# Patient Record
Sex: FEMALE | Race: OTHER
[De-identification: names, ages, dates, MRNs, and addresses within clinical notes are randomized per-mention and may not be internally consistent; named-entity substitution may affect disease eponyms.]

---

## 2018-03-06 ENCOUNTER — HOSPITAL ENCOUNTER (OUTPATIENT)
Dept: HOSPITAL 72 - PAN | Age: 48
Discharge: HOME | End: 2018-03-06
Payer: COMMERCIAL

## 2018-03-06 VITALS — DIASTOLIC BLOOD PRESSURE: 86 MMHG | SYSTOLIC BLOOD PRESSURE: 134 MMHG

## 2018-03-06 VITALS — WEIGHT: 120 LBS | BODY MASS INDEX: 18.83 KG/M2 | HEIGHT: 67 IN

## 2018-03-06 DIAGNOSIS — K21.9: ICD-10-CM

## 2018-03-06 DIAGNOSIS — K76.0: ICD-10-CM

## 2018-03-06 DIAGNOSIS — R11.2: ICD-10-CM

## 2018-03-06 DIAGNOSIS — K80.20: Primary | ICD-10-CM

## 2018-03-06 DIAGNOSIS — E78.5: ICD-10-CM

## 2018-03-06 DIAGNOSIS — R79.89: ICD-10-CM

## 2018-03-07 NOTE — GI INITIAL CONSULT NOTE
History of Present Illness


General


Date patient seen:  Mar 6, 2018


Time patient seen:  11:00


Referring physician:  TRA MCCLELLAN


Reason for Consultation:  ABDOMINAL PAIN





Present Illness


HPI


48 year old female patient referred by Dr. Mcclellan for evaluation of abdominal 

pain and elevated LFTs.  The patient presents today with c/o generalized 

abdominal pain, bloating, nausea with occasional vomiting and GERD.   Abdominal 

U/S reviewed shows fatty liver and cholelithiasis.  Labs reviewed shows vitamin 

D deficiency.  Denies any unintentional weight loss or changes in dietary 

habits. No signs of abuse or neglect.  Patient is not fall risk.


Home Meds


Reported Medications


Omeprazole (OMEPRAZOLE) 40 Mg Capsule., 40 MG ORAL DAILY, CAP


   3/6/18


Med list reviewed/reconciled:  Yes


Allergies:  


Coded Allergies:  


     No Known Allergies (Unverified , 3/6/18)





Patient History


History Provided By:  Patient, Medical Record


PMH Narrative


GERD


HLD





Past Surgical History:


 x 2


Family History Narrative


Father >> Lung CA


Social History Narrative


ETOH social use approximately 2 a month.


Tobacco use x 8 years, but has quit for 18 years


occasional caffeine use





Review of Systems


All Other Systems:  negative except mentioned in HPI





Physical Exam





Vital Signs








  Date Time  Temp Pulse Resp B/P (MAP) Pulse Ox O2 Delivery O2 Flow Rate FiO2


 


3/6/18 09:55 98.7 73  134/86 99   





 98.7       








Sp02 EP Interpretation:  reviewed, normal


General Appearance:  well appearing, no apparent distress, alert


Head:  normocephalic


EENT:  PERRL/EOMI, normal ENT inspection


Neck:  supple


Respiratory:  normal breath sounds, no respiratory distress


Cardiovascular:  normal rate


Gastrointestinal:  normal inspection, non tender, soft, normal bowel sounds, non

-distended


Rectal:  deferred


Genitourinary:  no CVA tenderness


Musculoskeletal:  normal inspection, back normal


Neurologic:  normal inspection, alert, oriented x3, responsive


Psychiatric:  normal inspection, judgement/insight normal, memory normal


Skin:  normal inspection, normal color, no rash, warm/dry, palpation normal, 

well hydrated


Lymphatic:  normal inspection, no adenopathy





GI: Plan


Problems:  


(1) Cholelithiasis


(2) GERD (gastroesophageal reflux disease)


(3) Nausea & vomiting


(4) LFT elevation


Plan


abdominal U/S reviewed


labs reviewed





patient requires cholecystectomy


will refer patient to general surgery





Seen with Dr. Vosoghi.


Thank you for this patient referral.











Matilda Dumont N.P. Mar 7, 2018 16:22

## 2018-05-02 ENCOUNTER — HOSPITAL ENCOUNTER (INPATIENT)
Dept: HOSPITAL 72 - EMR | Age: 48
LOS: 1 days | Discharge: HOME | DRG: 446 | End: 2018-05-03
Payer: COMMERCIAL

## 2018-05-02 VITALS — BODY MASS INDEX: 30.12 KG/M2 | WEIGHT: 170 LBS | HEIGHT: 63 IN

## 2018-05-02 VITALS — SYSTOLIC BLOOD PRESSURE: 141 MMHG | DIASTOLIC BLOOD PRESSURE: 77 MMHG

## 2018-05-02 VITALS — SYSTOLIC BLOOD PRESSURE: 140 MMHG | DIASTOLIC BLOOD PRESSURE: 76 MMHG

## 2018-05-02 VITALS — SYSTOLIC BLOOD PRESSURE: 135 MMHG | DIASTOLIC BLOOD PRESSURE: 74 MMHG

## 2018-05-02 VITALS — DIASTOLIC BLOOD PRESSURE: 85 MMHG | SYSTOLIC BLOOD PRESSURE: 137 MMHG

## 2018-05-02 VITALS — DIASTOLIC BLOOD PRESSURE: 75 MMHG | SYSTOLIC BLOOD PRESSURE: 134 MMHG

## 2018-05-02 DIAGNOSIS — K80.10: Primary | ICD-10-CM

## 2018-05-02 DIAGNOSIS — N83.202: ICD-10-CM

## 2018-05-02 DIAGNOSIS — K21.9: ICD-10-CM

## 2018-05-02 LAB
ADD MANUAL DIFF: NO
ALBUMIN SERPL-MCNC: 4 G/DL (ref 3.4–5)
ALBUMIN/GLOB SERPL: 1 {RATIO} (ref 1–2.7)
ALP SERPL-CCNC: 68 U/L (ref 46–116)
ALT SERPL-CCNC: 33 U/L (ref 12–78)
ANION GAP SERPL CALC-SCNC: 9 MMOL/L (ref 5–15)
APPEARANCE UR: CLEAR
APTT BLD: 29 SEC (ref 23–33)
APTT PPP: YELLOW S
AST SERPL-CCNC: 38 U/L (ref 15–37)
BASOPHILS NFR BLD AUTO: 0.9 % (ref 0–2)
BILIRUB SERPL-MCNC: 0.8 MG/DL (ref 0.2–1)
BUN SERPL-MCNC: 14 MG/DL (ref 7–18)
CALCIUM SERPL-MCNC: 9.2 MG/DL (ref 8.5–10.1)
CHLORIDE SERPL-SCNC: 101 MMOL/L (ref 98–107)
CO2 SERPL-SCNC: 28 MMOL/L (ref 21–32)
CREAT SERPL-MCNC: 0.8 MG/DL (ref 0.55–1.3)
EOSINOPHIL NFR BLD AUTO: 3.4 % (ref 0–3)
ERYTHROCYTE [DISTWIDTH] IN BLOOD BY AUTOMATED COUNT: 11.4 % (ref 11.6–14.8)
GLOBULIN SER-MCNC: 4.2 G/DL
GLUCOSE UR STRIP-MCNC: NEGATIVE MG/DL
HCT VFR BLD CALC: 42.4 % (ref 37–47)
HGB BLD-MCNC: 14.7 G/DL (ref 12–16)
INR PPP: 1 (ref 0.9–1.1)
KETONES UR QL STRIP: (no result)
LEUKOCYTE ESTERASE UR QL STRIP: (no result)
LYMPHOCYTES NFR BLD AUTO: 28.5 % (ref 20–45)
MCV RBC AUTO: 87 FL (ref 80–99)
MONOCYTES NFR BLD AUTO: 7.3 % (ref 1–10)
NEUTROPHILS NFR BLD AUTO: 59.9 % (ref 45–75)
NITRITE UR QL STRIP: NEGATIVE
PH UR STRIP: 7 [PH] (ref 4.5–8)
PLATELET # BLD: 225 K/UL (ref 150–450)
POTASSIUM SERPL-SCNC: 3.8 MMOL/L (ref 3.5–5.1)
PROT UR QL STRIP: (no result)
RBC # BLD AUTO: 4.9 M/UL (ref 4.2–5.4)
SODIUM SERPL-SCNC: 138 MMOL/L (ref 136–145)
SP GR UR STRIP: 1.01 (ref 1–1.03)
UROBILINOGEN UR-MCNC: 1 MG/DL (ref 0–1)
WBC # BLD AUTO: 7.6 K/UL (ref 4.8–10.8)

## 2018-05-02 PROCEDURE — 86850 RBC ANTIBODY SCREEN: CPT

## 2018-05-02 PROCEDURE — 36415 COLL VENOUS BLD VENIPUNCTURE: CPT

## 2018-05-02 PROCEDURE — 76700 US EXAM ABDOM COMPLETE: CPT

## 2018-05-02 PROCEDURE — 85610 PROTHROMBIN TIME: CPT

## 2018-05-02 PROCEDURE — 99285 EMERGENCY DEPT VISIT HI MDM: CPT

## 2018-05-02 PROCEDURE — 80053 COMPREHEN METABOLIC PANEL: CPT

## 2018-05-02 PROCEDURE — 85730 THROMBOPLASTIN TIME PARTIAL: CPT

## 2018-05-02 PROCEDURE — 83690 ASSAY OF LIPASE: CPT

## 2018-05-02 PROCEDURE — 86900 BLOOD TYPING SEROLOGIC ABO: CPT

## 2018-05-02 PROCEDURE — 74177 CT ABD & PELVIS W/CONTRAST: CPT

## 2018-05-02 PROCEDURE — 85025 COMPLETE CBC W/AUTO DIFF WBC: CPT

## 2018-05-02 PROCEDURE — 86901 BLOOD TYPING SEROLOGIC RH(D): CPT

## 2018-05-02 PROCEDURE — 84484 ASSAY OF TROPONIN QUANT: CPT

## 2018-05-02 PROCEDURE — 81003 URINALYSIS AUTO W/O SCOPE: CPT

## 2018-05-02 RX ADMIN — HUMAN INSULIN SCH MLS/HR: 100 INJECTION, SOLUTION SUBCUTANEOUS at 20:38

## 2018-05-02 NOTE — EMERGENCY ROOM REPORT
History of Present Illness


General


Chief Complaint:  Abdominal Pain


Source:  Patient





Present Illness


HPI


Patient is a 48-year-old female who presented after increased the sharp 

epigastric pain.  Patient reports having severe pain which did not radiate.  

She puts having associated nausea and vomiting.  She denies any hematemesis.  

She denies black or bloody stools.  Patient prior history of gallbladder 

disease and was scheduled for cholecystectomy in June.  The patient had onset 

of pain after eating taquitos


Allergies:  


Coded Allergies:  


     No Known Allergies (Unverified , 3/6/18)





Patient History


Past Medical History:  see triage record


Reviewed Nursing Documentation:  PMH: Agreed; PSxH: Agreed





Nursing Documentation-PMH


Hx Cardiac Problems:  Yes


Hx Cancer:  No


Hx Gastrointestinal Problems:  Yes - GALLSTONE


Hx Neurological Problems:  No





Review of Systems


All Other Systems:  negative except mentioned in HPI





Physical Exam





Vital Signs








  Date Time  Temp Pulse Resp B/P (MAP) Pulse Ox O2 Delivery O2 Flow Rate FiO2


 


5/2/18 14:05 97.8 83 20 137/85 99 Room Air  





 97.9       








Sp02 EP Interpretation:  reviewed, normal


General Appearance:  normal inspection, well appearing, no apparent distress, 

alert, GCS 15


Head:  atraumatic


ENT:  normal ENT inspection, hearing grossly normal, normal voice


Neck:  normal inspection, full range of motion, supple, no bony tend


Respiratory:  normal inspection, lungs clear, normal breath sounds, no 

respiratory distress, no retraction, no wheezing


Cardiovascular #1:  regular rate, rhythm, no edema


Gastrointestinal:  normal inspection, normal bowel sounds, non tender, soft, no 

guarding, no hernia, tenderness - right upper quadrant


Genitourinary:  no CVA tenderness


Musculoskeletal:  normal inspection, back normal, normal range of motion


Neurologic:  normal inspection, alert, oriented x3, responsive, CNs III-XII nml 

as tested, speech normal


Psychiatric:  normal inspection, judgement/insight normal, mood/affect normal


Skin:  normal inspection, normal color, no rash





Medical Decision Making


Diagnostic Impression:  


 Primary Impression:  


 Choledocholithiasis


ER Course


Patient presented for abdominal pain. Differential diagnoses included ischemic 

bowel, appendicitis, perforated viscus, abdominal aortic aneurysm, inferior 

myocardial infarction, viral gastroenteritis


Because of complexity of patient's case laboratory testing and imaging studies 

were ordered.The abdominal ultrasound read by radiology showed dilated common 

bile duct .  Common bile duct was greater than 7 mmconsistent with possible 

choledocholithiasis.  The patient was given a GI cocktail without any 

improvement.  Patient also given IV Toradol.The patient was discussed with Dr. Kelton Rosales for inpatient management due to capitated physician.  Dr. Muñoz 

was contacted for GI consult





Labs








Test


  5/2/18


14:20 5/2/18


14:55


 


White Blood Count


  7.6 K/UL


(4.8-10.8) 


 


 


Red Blood Count


  4.90 M/UL


(4.20-5.40) 


 


 


Hemoglobin


  14.7 G/DL


(12.0-16.0) 


 


 


Hematocrit


  42.4 %


(37.0-47.0) 


 


 


Mean Corpuscular Volume 87 FL (80-99)  


 


Mean Corpuscular Hemoglobin


  30.1 PG


(27.0-31.0) 


 


 


Mean Corpuscular Hemoglobin


Concent 34.8 G/DL


(32.0-36.0) 


 


 


Red Cell Distribution Width


  11.4 %


(11.6-14.8) 


 


 


Platelet Count


  225 K/UL


(150-450) 


 


 


Mean Platelet Volume


  6.5 FL


(6.5-10.1) 


 


 


Neutrophils (%) (Auto)


  59.9 %


(45.0-75.0) 


 


 


Lymphocytes (%) (Auto)


  28.5 %


(20.0-45.0) 


 


 


Monocytes (%) (Auto)


  7.3 %


(1.0-10.0) 


 


 


Eosinophils (%) (Auto)


  3.4 %


(0.0-3.0) 


 


 


Basophils (%) (Auto)


  0.9 %


(0.0-2.0) 


 


 


Prothrombin Time


  10.0 SEC


(9.30-11.50) 


 


 


Prothromb Time International


Ratio 1.0 (0.9-1.1) 


  


 


 


Activated Partial


Thromboplast Time 29 SEC (23-33) 


  


 


 


Sodium Level


  138 MMOL/L


(136-145) 


 


 


Potassium Level


  3.8 MMOL/L


(3.5-5.1) 


 


 


Chloride Level


  101 MMOL/L


() 


 


 


Carbon Dioxide Level


  28 MMOL/L


(21-32) 


 


 


Anion Gap


  9 mmol/L


(5-15) 


 


 


Blood Urea Nitrogen


  14 mg/dL


(7-18) 


 


 


Creatinine


  0.8 MG/DL


(0.55-1.30) 


 


 


Estimat Glomerular Filtration


Rate > 60 mL/min


(>60) 


 


 


Glucose Level


  111 MG/DL


() 


 


 


Calcium Level


  9.2 MG/DL


(8.5-10.1) 


 


 


Total Bilirubin


  0.8 MG/DL


(0.2-1.0) 


 


 


Aspartate Amino Transf


(AST/SGOT) 38 U/L (15-37) 


  


 


 


Alanine Aminotransferase


(ALT/SGPT) 33 U/L (12-78) 


  


 


 


Alkaline Phosphatase


  68 U/L


() 


 


 


Troponin I


  0.000 ng/mL


(0.000-0.056) 


 


 


Total Protein


  8.2 G/DL


(6.4-8.2) 


 


 


Albumin


  4.0 G/DL


(3.4-5.0) 


 


 


Globulin 4.2 g/dL  


 


Albumin/Globulin Ratio 1.0 (1.0-2.7)  


 


Lipase


  140 U/L


() 


 


 


Urine Color  Yellow 


 


Urine Appearance  Clear 


 


Urine pH  7 (4.5-8.0) 


 


Urine Specific Gravity


  


  1.015


(1.005-1.035)


 


Urine Protein  1+ (NEGATIVE) 


 


Urine Glucose (UA)


  


  Negative


(NEGATIVE)


 


Urine Ketones  1+ (NEGATIVE) 


 


Urine Occult Blood  2+ (NEGATIVE) 


 


Urine Nitrite


  


  Negative


(NEGATIVE)


 


Urine Bilirubin


  


  Negative


(NEGATIVE)


 


Urine Urobilinogen


  


  1 MG/DL


(0.0-1.0)


 


Urine Leukocyte Esterase  2+ (NEGATIVE) 


 


Urine RBC


  


  2-4 /HPF (0 -


2)


 


Urine WBC


  


  0-2 /HPF (0 -


2)


 


Urine Squamous Epithelial


Cells 


  Few /LPF


(NONE/OCC)


 


Urine Bacteria


  


  Few /HPF


(NONE)











Last Vital Signs








  Date Time  Temp Pulse Resp B/P (MAP) Pulse Ox O2 Delivery O2 Flow Rate FiO2


 


5/2/18 14:05 97.8 83 20 137/85 99 Room Air  





 97.9       








Status:  unchanged


Disposition:  ADMITTED AS INPATIENT


Condition:  Serious


Scripts


Dicyclomine Hcl* (DICYCLOMINE HCL*) 10 Mg Capsule


10 MG PO QID, #30 CAP


   Prov: Hayden Gibson         5/2/18











Hayden Gibson May 2, 2018 14:18

## 2018-05-03 VITALS — SYSTOLIC BLOOD PRESSURE: 133 MMHG | DIASTOLIC BLOOD PRESSURE: 75 MMHG

## 2018-05-03 VITALS — DIASTOLIC BLOOD PRESSURE: 77 MMHG | SYSTOLIC BLOOD PRESSURE: 125 MMHG

## 2018-05-03 VITALS — DIASTOLIC BLOOD PRESSURE: 68 MMHG | SYSTOLIC BLOOD PRESSURE: 124 MMHG

## 2018-05-03 VITALS — DIASTOLIC BLOOD PRESSURE: 73 MMHG | SYSTOLIC BLOOD PRESSURE: 126 MMHG

## 2018-05-03 RX ADMIN — HUMAN INSULIN SCH MLS/HR: 100 INJECTION, SOLUTION SUBCUTANEOUS at 12:47

## 2018-05-03 NOTE — HISTORY AND PHYSICAL REPORT
DATE OF ADMISSION:  2018



HISTORY OF PRESENT ILLNESS:  This is a very pleasant 48-year-old female

with a previous history of choledocholithiasis.  She has a scheduled

cholecystectomy at [email or web address] in the near future.  The patient

denies any previous history except for history of previous .  She

came to the hospital with abdominal pain.  She also had nausea.  She was

admitted overnight due to findings of positive sonographic Ledezma sign on

ultrasonography as well as pericholecystic fluid.



The patient denies any headaches, hematemesis, melena, hematochezia, or

weight loss.



PAST MEDICAL HISTORY:  Choledocholithiasis, cholelithiasis, and previous

.



ALLERGIES:  None reported.



HOME MEDICATIONS:  None.



PHYSICAL EXAMINATION:

VITAL SIGNS:  Blood pressure 130/70, heart rate _____.  She is afebrile.

GENERAL:  Reveals a young female.

HEENT:  Unremarkable.

CHEST:  Clear breath sounds bilaterally.

ABDOMEN:  Soft.  There is no edema.  There is mild right _____

discomfort.

NEUROLOGIC:  Nonfocal.



LABORATORY AND DIAGNOSTIC DATA:  Lab testing shows white count 7.6,

hemoglobin 14, platelet count is normal.  Chemistry is unremarkable.

Glucose is 111.  AST 38 and ALT 33.  Troponins negative.  Coags are

negative.  Urinalysis negative.  Imaging studies, discussed above.  There

is evidence of CBD being 7.8 mm and there are renal stones as well as a

left renal cyst.



IMPRESSION:

1. Cholelithiasis.

2. Possible cholecystitis.



DISCUSSION:  We will consult Surgery.  The patient may be a candidate for

cholecystectomy.  If not, we will consider discharge to home and

outpatient followup.









  ______________________________________________

  Kelton Rosales M.D.





DR:  JOLIE

D:  2018 10:01

T:  2018 19:28

JOB#:  [id number]

CC:

## 2018-05-03 NOTE — GI INITIAL CONSULT NOTE
History of Present Illness


General


Date patient seen:  May 3, 2018


Time patient seen:  16:46


Reason for Hospitalization:  Abdominal Pain


Referring physician:  JADYN LORENZANA


Reason for Consultation:  ABDOMINAL PAIN





Present Illness


HPI


Patient is a 48-year-old female who presented after increased the sharp 

epigastric pain.  Patient reports having severe pain which did not radiate.  

She puts having associated nausea and vomiting.  She denies any hematemesis.  

She denies black or bloody stools.  Patient prior history of gallbladder 

disease and was scheduled for cholecystectomy in June.  The patient had onset 

of pain after eating taquitos


GI consult for abdominal pain.    Pt seen, awake A&Ox4 NAD c/o of abdominal 

pain.   State has been ongoing for the past 10 months, has been more recurrent 

within the past month.  Abdominal U/S reviewed shows cholelithiasis.  Labs 

reviewed show slight AST elevation.  No history of colonoscopy / endoscopy.


Home Meds


Active Scripts


Dicyclomine Hcl* (DICYCLOMINE HCL*) 10 Mg Capsule, 10 MG PO QID, #30 CAP


   Prov:Hayden Gibson         5/2/18


Reported Medications


Ondansetron Odt* (ZOFRAN ODT*) 8 Mg Tab.rapdis, 8 MG ORAL Q8HR PRN for Nausea & 

Vomiting, #30 TAB


   5/3/18


Omeprazole (OMEPRAZOLE) 20 Mg Capsule.dr, 20 MG ORAL DAILY, #30 CAP


   5/3/18


No Known Medications* (NKM - No Known Medications*)  ., 0 ., 0 Refills


   5/2/18


Discontinued Reported Medications


Omeprazole (OMEPRAZOLE) 40 Mg Capsule.dr, 40 MG ORAL DAILY, CAP


   3/6/18


Med list reviewed/reconciled:  Yes


Allergies:  


Coded Allergies:  


     No Known Allergies (Unverified , 3/6/18)





Patient History


History Provided By:  Patient, Medical Record


PMH Narrative


Past Medical History:  see triage record


Reviewed Nursing Documentation:  PMH: Agreed; PSxH: Agreed





Nursing Documentation-PMH


Hx Cardiac Problems:  Yes


Hx Cancer:  No


Hx Gastrointestinal Problems:  Yes - GALLSTONE


Hx Neurological Problems:  No


Social History:  Denies: smoking, alcohol use, drug use, other





Review of Systems


All Other Systems:  negative except mentioned in HPI





Physical Exam





Vital Signs








  Date Time  Temp Pulse Resp B/P (MAP) Pulse Ox O2 Delivery O2 Flow Rate FiO2


 


5/2/18 14:05 97.8 83 20 137/85 99 Room Air  





 97.9       








Sp02 EP Interpretation:  reviewed, normal


Labs





Laboratory Tests








Test


  5/2/18


14:20 5/2/18


14:55


 


White Blood Count


  7.6 K/UL


(4.8-10.8) 


 


 


Red Blood Count


  4.90 M/UL


(4.20-5.40) 


 


 


Hemoglobin


  14.7 G/DL


(12.0-16.0) 


 


 


Hematocrit


  42.4 %


(37.0-47.0) 


 


 


Mean Corpuscular Volume 87 FL (80-99)   


 


Mean Corpuscular Hemoglobin


  30.1 PG


(27.0-31.0) 


 


 


Mean Corpuscular Hemoglobin


Concent 34.8 G/DL


(32.0-36.0) 


 


 


Red Cell Distribution Width


  11.4 %


(11.6-14.8)  L 


 


 


Platelet Count


  225 K/UL


(150-450) 


 


 


Mean Platelet Volume


  6.5 FL


(6.5-10.1) 


 


 


Neutrophils (%) (Auto)


  59.9 %


(45.0-75.0) 


 


 


Lymphocytes (%) (Auto)


  28.5 %


(20.0-45.0) 


 


 


Monocytes (%) (Auto)


  7.3 %


(1.0-10.0) 


 


 


Eosinophils (%) (Auto)


  3.4 %


(0.0-3.0)  H 


 


 


Basophils (%) (Auto)


  0.9 %


(0.0-2.0) 


 


 


Prothrombin Time


  10.0 SEC


(9.30-11.50) 


 


 


Prothromb Time International


Ratio 1.0 (0.9-1.1)  


  


 


 


Activated Partial


Thromboplast Time 29 SEC (23-33)


  


 


 


Sodium Level


  138 MMOL/L


(136-145) 


 


 


Potassium Level


  3.8 MMOL/L


(3.5-5.1) 


 


 


Chloride Level


  101 MMOL/L


() 


 


 


Carbon Dioxide Level


  28 MMOL/L


(21-32) 


 


 


Anion Gap


  9 mmol/L


(5-15) 


 


 


Blood Urea Nitrogen


  14 mg/dL


(7-18) 


 


 


Creatinine


  0.8 MG/DL


(0.55-1.30) 


 


 


Estimat Glomerular Filtration


Rate > 60 mL/min


(>60) 


 


 


Glucose Level


  111 MG/DL


()  H 


 


 


Calcium Level


  9.2 MG/DL


(8.5-10.1) 


 


 


Total Bilirubin


  0.8 MG/DL


(0.2-1.0) 


 


 


Aspartate Amino Transf


(AST/SGOT) 38 U/L (15-37)


H 


 


 


Alanine Aminotransferase


(ALT/SGPT) 33 U/L (12-78)


  


 


 


Alkaline Phosphatase


  68 U/L


() 


 


 


Troponin I


  0.000 ng/mL


(0.000-0.056) 


 


 


Total Protein


  8.2 G/DL


(6.4-8.2) 


 


 


Albumin


  4.0 G/DL


(3.4-5.0) 


 


 


Globulin 4.2 g/dL   


 


Albumin/Globulin Ratio 1.0 (1.0-2.7)   


 


Lipase


  140 U/L


() 


 


 


Urine Color  Yellow  


 


Urine Appearance  Clear  


 


Urine pH  7 (4.5-8.0)  


 


Urine Specific Gravity


  


  1.015


(1.005-1.035)


 


Urine Protein


  


  1+ (NEGATIVE)


H


 


Urine Glucose (UA)


  


  Negative


(NEGATIVE)


 


Urine Ketones


  


  1+ (NEGATIVE)


H


 


Urine Occult Blood


  


  2+ (NEGATIVE)


H


 


Urine Nitrite


  


  Negative


(NEGATIVE)


 


Urine Bilirubin


  


  Negative


(NEGATIVE)


 


Urine Urobilinogen


  


  1 MG/DL


(0.0-1.0)  H


 


Urine Leukocyte Esterase


  


  2+ (NEGATIVE)


H


 


Urine RBC


  


  2-4 /HPF (0 -


2)  H


 


Urine WBC


  


  0-2 /HPF (0 -


2)


 


Urine Squamous Epithelial


Cells 


  Few /LPF


(NONE/OCC)


 


Urine Bacteria


  


  Few /HPF


(NONE)








General Appearance:  well appearing, no apparent distress, alert


Head:  normocephalic


EENT:  PERRL/EOMI, normal ENT inspection


Neck:  supple


Respiratory:  normal breath sounds, no respiratory distress


Cardiovascular:  normal rate


Gastrointestinal:  normal inspection, non tender, soft, normal bowel sounds, non

-distended


Rectal:  deferred


Genitourinary:  no CVA tenderness


Musculoskeletal:  normal inspection, back normal


Neurologic:  normal inspection, alert, oriented x3, responsive


Psychiatric:  normal inspection, judgement/insight normal, memory normal


Skin:  normal inspection, normal color, no rash, warm/dry, palpation normal, 

well hydrated


Lymphatic:  normal inspection, no adenopathy


Current Medications





Current Medications








 Medications


  (Trade)  Dose


 Ordered  Sig/Hui


 Route


 PRN Reason  Start Time


 Stop Time Status Last Admin


Dose Admin


 


 Ceftriaxone


 Sodium 1 gm/


 Dextrose  55 ml @ 


 110 mls/hr  Q24H


 IVPB


   5/2/18 21:00


 5/9/18 23:59  5/2/18 20:37


 


 


 Dextrose/Sodium


 Chloride  1,000 ml @ 


 75 mls/hr  [id number]


 IV


   5/2/18 20:00


 6/1/18 19:59  5/2/18 20:38


 


 


 Hydromorphone HCl


  (Dilaudid)  0.5 mg  Q4H  PRN


 IVP


 Mild Pain (Pain Scale 1-3)  5/3/18 07:00


 5/10/18 06:59   


 


 


 Hydromorphone HCl


  (Dilaudid)  1 mg  Q4H  PRN


 IVP


 Severe Pain (Pain Scale 7-10)  5/2/18 20:00


 5/9/18 19:59  5/3/18 00:40


 


 


 Iopamidol


  (Isovue-300


 100ml)  100 ml  NOW  PRN


 INJ


 Radiology Procedure  5/3/18 12:30


 5/5/18 23:59   


 


 


 Ondansetron HCl


  (Zofran)  4 mg  Q4H  PRN


 IVP


 Nausea & Vomiting  5/3/18 02:15


 6/2/18 02:14  5/3/18 09:02


 











GI: Plan


Problems:  


(1) Cholelithiasis


(2) LFT elevation


(3) Nausea & vomiting


(4) GERD (gastroesophageal reflux disease)


Plan


abdominal US reviewed >> cholelithiasis, fu with surgical


surgical intervention reviewed >> nonemergent, ok for outpatient


symptomatic treatment


pain mgmt


ppi


fu labs





Discussed with Dr. Muñoz.


Thank you for this patient referral, we will follow.











Matilda Dumont N.P. May 3, 2018 12:35

## 2018-05-03 NOTE — DIAGNOSTIC IMAGING REPORT
Indication:Abdominal pain

 

Technique: Grayscale and duplex Doppler imaging of the abdomen performed.

 

Comparison: None

 

Findings:

 

The liver, demonstrated part of the pancreas, aorta and IVC, spleen appear

unremarkable. Gallstones are present. Positive sonographic Ledezma's is reported.

Cholecystitis not excluded. There is no wall thickening or pericholecystic fluid. CBD

is prominent measuring between 7 and 8 mm. Doppler evaluation of the main portal vein

shows patency. There is no ascites. No hydronephrosis seen. However, there are some

echogenic foci within the kidneys which may be 6 small nonobstructive stones. There

is a probable left renal cyst measuring 2 cm.

 

Impression:

 

Gallstones. Positive sonographic Ledezma's per technologist. Cholecystitis not

excluded. Correlate clinically.

 

Slightly prominent CBD. This may be normal. Correlate clinically

 

Suggestion of nonobstructive bilateral renal stones.

## 2018-05-03 NOTE — CONSULTATION
History of Present Illness


General


Date patient seen:  May 3, 2018


Chief Complaint:  Abdominal Pain


Reason for Consultation:  abdominal pain





Present Illness


HPI


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 

with associated nausea and emesis yesterday evening.  pain cramping epigastric 

pain that radiates to right and left upper quadrants.  nausea, non bloody 

emesis.  had ultrasound which demonstrated cholelithiasis and +solano's at the 

time.  since pain has resolved.  some nausea but controlled with zofran.  

emesis resolved.  states that she has had similar events in the past.  has been 

diagnosed with symptomatic cholelithiasis and is scheduled to have elective 

cholecystectomy in june 11, 2018.  Initially had episodes similar infrequently 

but has been having them more frequently as of a few months ago.  +flatus.  +BM'

s.


Allergies:  


Coded Allergies:  


     No Known Allergies (Unverified , 3/6/18)





Medication History


Scheduled


Dicyclomine Hcl* (Dicyclomine Hcl*), 10 MG PO QID


No Known Medications* (NKM - No Known Medications*), 0 ., (Reported)





Discontinued Medications


Omeprazole (Omeprazole), 40 MG ORAL DAILY, (Reported)


   Discontinued Reason: Pt stopped taking med





Patient History


History Provided By:  Patient, Medical Record, PMD


Healthcare decision maker





Resuscitation status


Full Code


Advanced Directive on File


No





Past Medical/Surgical History


Past Medical/Surgical History:  


(1) GERD (gastroesophageal reflux disease)


(2) Nausea & vomiting


(3) LFT elevation


(4) Choledocholithiasis


(5) Cholelithiasis





Review of Systems


Constitutional:  Denies: no symptoms, see HPI, chills, sweats, fever, malaise, 

weakness, other


Eye:  Denies: no symptoms, see HPI, eye pain, blurred vision, tearing, double 

vision, nose pain, nose congestion, acuity changes, discharge, other


ENT:  Denies: no symptoms, see HPI, ear pain, ear discharge, nose pain, nose 

congestion, throat pain, throat swelling, mouth pain, hearing loss, nasal 

discharge, other


Respiratory:  Denies: no symptoms, see HPI, cough, orthopnea, shortness of 

breath, stridor, wheezing, WILSON, sputum, other


Cardiovascular:  Denies: no symptoms, see HPI, chest pain, edema, palpitations, 

syncope, PND, other


Gastrointestinal:  Denies: no symptoms, see HPI, abdominal pain, constipation, 

diarrhea, nausea, vomiting, melena, hematemesis, other


Genitourinary:  Denies: no symptoms, see HPI, discharge, dysuria, frequency, 

hematuria, pain, retention, incontinence, urgency, vag bleed/dc, other


Musculoskeletal:  Denies: no symptoms, see HPI, back pain, gout, joint pain, 

joint swelling, muscle pain, muscle stiffness, other


Skin:  Denies: no symptoms, see HPI, rash, change in color, change in hair/nails

, dryness, lesions, other


Psychiatric:  Denies: no symptoms, see HPI, prior hx, anxiety, depressed 

feelings, emotional problems, SI, HI, hallucinations, other


Neurological:  Denies: no symptoms, see HPI, headache, numbness, paresthesia, 

seizure, tingling, tremors, focal weakness, syncope, dizziness, other


Endocrine:  Denies: no symptoms, see HPI, excessive sweating, flushing, 

intolerance to temperature, increased thirst, increased urine, unexplained 

weight loss, other


Hematologic/Lymphatic:  Denies: no symptoms, see HPI, anemia, blood clots, easy 

bleeding, easy bruising, swollen glands, diathesis, other


All Other Systems:  negative except mentioned in HPI





Physical Exam


General Appearance:  no apparent distress, alert


Lines, tubes and drains:  peripheral


HEENT:  normocephalic, mucous membranes moist, PERRL


Neck:  normal alignment


Respiratory/Chest:  lungs clear, normal breath sounds, no respiratory distress, 

no accessory muscle use


Cardiovascular/Chest:  normal peripheral pulses, normal rate, regular rhythm


Abdomen:  normal bowel sounds, soft, no organomegaly, no mass, tender - can 

identify tenderness in umbilical region.  no murphys on my exam


Extremities:  normal range of motion, non-tender, normal inspection


Skin Exam:  normal pigmentation, warm/dry


Neurologic:  alert, oriented x 3





Last 24 Hour Vital Signs








  Date Time  Temp Pulse Resp B/P (MAP) Pulse Ox O2 Delivery O2 Flow Rate FiO2


 


5/3/18 12:00 97.8 67 16 124/68 99   





 97.8       


 


5/3/18 08:00 97.6 67 18 125/77 98   





 97.6       


 


5/3/18 04:00 98.7 73 18 133/75 95 Room Air  





 98.7       


 


5/3/18 01:10 98.3       


 


5/3/18 00:40 98.3       


 


5/3/18 00:00 98.3 74 18 126/73 98   





 98.3       


 


5/2/18 19:56 98.0 70 18 140/76 97   





 98.0       


 


5/2/18 18:44 98.7 72 20 141/77 96 Room Air  





 98.7       


 


5/2/18 18:15 97.8 78 18 135/74 99 Room Air  





 97.8       


 


5/2/18 17:53 97.8 78 18 135/74 99 Room Air  





 97.8       


 


5/2/18 15:40  79 18 134/75 99 Room Air  


 


5/2/18 15:40 97.8       

















Intake and Output  


 


 5/2/18 5/3/18





 19:00 07:00


 


Intake Total 60 ml 675 ml


 


Balance 60 ml 675 ml


 


  


 


Intake Oral 60 ml 


 


IV Total  675 ml


 


# Voids  2








Height (Feet):  5


Height (Inches):  3.00


Weight (Pounds):  170


Medications





Current Medications








 Medications


  (Trade)  Dose


 Ordered  Sig/Hui


 Route


 PRN Reason  Start Time


 Stop Time Status Last Admin


Dose Admin


 


 Acetaminophen/


 Hydrocodone Bitart


  (Norco 5/325)  1 tab  Q6H  PRN


 ORAL


 For Mod Pain  5/3/18 12:39


 5/10/18 12:38   


 


 


 Ceftriaxone


 Sodium 1 gm/


 Dextrose  55 ml @ 


 110 mls/hr  Q24H


 IVPB


   5/2/18 21:00


 5/9/18 23:59  5/2/18 20:37


 


 


 Dextrose/Sodium


 Chloride  1,000 ml @ 


 75 mls/hr  [id number]


 IV


   5/2/18 20:00


 6/1/18 19:59  5/3/18 12:47


 


 


 Hydromorphone HCl


  (Dilaudid)  0.5 mg  Q4H  PRN


 IVP


 Mild Pain (Pain Scale 1-3)  5/3/18 07:00


 5/10/18 06:59   


 


 


 Hydromorphone HCl


  (Dilaudid)  1 mg  Q4H  PRN


 IVP


 Severe Pain (Pain Scale 7-10)  5/2/18 20:00


 5/9/18 19:59  5/3/18 00:40


 


 


 Iopamidol


  (Isovue-300


 100ml)  100 ml  NOW  PRN


 INJ


 Radiology Procedure  5/3/18 12:30


 5/5/18 23:59   


 


 


 Ondansetron HCl


  (Zofran)  4 mg  Q4H  PRN


 IVP


 Nausea & Vomiting  5/3/18 02:15


 6/2/18 02:14  5/3/18 09:02


 











Assessment/Plan


Problem List:  


(1) Cholelithiasis


Assessment & Plan:  doing well.  stable.  improving.  pain resolved.  no n/v/f/

c.  


did have umbilical pain on exam that was more than anticipated.  CT scan 

performed and identified reducible umbilical hernia.  no other abnormalities 

noted.  





likely episode of biliary colic.  resolved. stable





-d/c home


-follow up with planned elective cholecystectomy as scheduled by her surgeon


-follow up with me as needed


-okay to return to work


-rx as written


-return if worsening condition 


thank you for this consultation.


ICD Codes:  K80.20 - Calculus of gallbladder without cholecystitis without 

obstruction


SNOMED:  [id number]


Qualifiers:  


   


Status:  stable











GauravLuigi baum May 3, 2018 15:08

## 2018-05-03 NOTE — DIAGNOSTIC IMAGING REPORT
Indication: Abdominal pain

 

Technique: Continuous helical transaxial imaging of the abdomen and pelvis was

obtained from the lung bases to the pubic symphysis during intravenous contrast

administration. Coronal 2-D reformats were also obtained. Study obtained in a Siemens

sensation 64 slice CT.  Automatic Exposure Control was utilized.

 

Total Dose length Product (DLP):  926.02 mGycm

 

CT Dose Index Volume (CTDIvol):   16.58 mGy

 

Comparison: None

 

Findings: The lung bases are essentially clear. Liver is unremarkable in appearance.

Gallbladder is mildly distended. Spleen is unremarkable. Pancreas is unremarkable.

There is no free fluid. There is a left renal cyst centrally measuring approximately

2 cm. The appendix is normal. No evidence of bowel obstruction. There is an

involuting cyst with enhancing slightly irregular borders are within the left ovary.

Uterus is noted and unremarkable.

 

IMPRESSION:

 

Small involuting left ovarian cyst noted.

 

Normal appendix.

 

Left renal cyst 2 cm in size.

 

 

 

 

 

The CT scanner at [facility] is accredited by the [employer] College of

Radiology and the scans are performed using dose optimization techniques as

appropriate to a performed exam including Automatic Exposure control.

## 2018-05-04 NOTE — DISCHARGE SUMMARY
Discharge Summary


DATE OF ADMISSION: 2018


DATE OF DISCHARGE: 2018





CONSULTANTS: Dr. Luigi Muñoz   





BRIEF HOSPITAL COURSE:


Patient is a 48-year-old female, with previous history of choledocholithiasis.  

She has a scheduled cholecystectomy in the near future.  She came to the 

hospital complaining of abdominal pain with nausea.  She denied headaches, 

hematemesis, melena, hematochezia or weight loss.  She has medical history 

significant for cholelithiasis, cholelithiasis and previous .


On evaluation at ED, blood work did not show any leukocytosis.  Chemistry 

unremarkable.  AST 38, AST 33.  Urinalysis was negative.  Troponin was 

negative.  Abdominal ultrasound showed gallstones with positive sonographic 

Ledezma's.  Slightly prominent CBD.  Abdominal and pelvic CT showed gallbladder 

mildly distended.  There is an involuting cyst on the left ovary.  Appendix 

normal.  She was given symptomatic treatment and pain management.  She was 

given proton pump inhibitors.  She was seen by GI and surgeon.  No emergent 

surgical intervention needed.  Patient was recommended to follow-up with 

planned elective cholecystectomy previously scheduled.  Okay to return to work.

  Patient was discharged home.





FINAL DIAGNOSES: 


Cholelithiasis


Possible cholecystitis





DISPOSITION: Patient was discharged home.





DISCHARGE MEDICATIONS: Refer to Discharge Medication List.





DISCHARGE INSTRUCTIONS: Follow up with PCP in a week.








I have been assigned to dictate discharge summary on this account, and I was 

not involved in the patient's management.











Tana Gipson NP May 4, 2018 14:36

## 2018-10-13 ENCOUNTER — HOSPITAL ENCOUNTER (EMERGENCY)
Dept: HOSPITAL 72 - EMR | Age: 48
Discharge: HOME | End: 2018-10-13
Payer: COMMERCIAL

## 2018-10-13 VITALS — WEIGHT: 152 LBS | HEIGHT: 63 IN | BODY MASS INDEX: 26.93 KG/M2

## 2018-10-13 VITALS — DIASTOLIC BLOOD PRESSURE: 86 MMHG | SYSTOLIC BLOOD PRESSURE: 158 MMHG

## 2018-10-13 VITALS — SYSTOLIC BLOOD PRESSURE: 144 MMHG | DIASTOLIC BLOOD PRESSURE: 85 MMHG
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
## 2018-10-13 DIAGNOSIS — S06.0X9A: Primary | ICD-10-CM
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
## 2018-10-13 DIAGNOSIS — R51: ICD-10-CM

## 2018-10-13 DIAGNOSIS — S13.9XXA: ICD-10-CM

## 2018-10-13 DIAGNOSIS — Z90.49: ICD-10-CM

## 2018-10-13 DIAGNOSIS — S30.1XXA: ICD-10-CM
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
## 2018-10-13 PROCEDURE — 72050 X-RAY EXAM NECK SPINE 4/5VWS: CPT

## 2018-10-13 PROCEDURE — 81025 URINE PREGNANCY TEST: CPT

## 2018-10-13 PROCEDURE — 99284 EMERGENCY DEPT VISIT MOD MDM: CPT

## 2018-10-13 PROCEDURE — 70450 CT HEAD/BRAIN W/O DYE: CPT

## 2018-10-13 NOTE — DIAGNOSTIC IMAGING REPORT
INDICATION: Pain

 

TECHNIQUE:  Multiple, contiguous 2.5 mm axial cuts of the brain are 

obtained from the posterior fossa to the cranial vault.  Sagittal and 

coronal reformatted images provided.  No IV contrast is administered.  

One or more of the following dose reduction techniques were used: 

automated exposure control, adjustment of the mA and/or kV according to 

patient size, use of iterative reconstruction technique.

 

COMPARISON: None

 

FINDINGS:  No intracranial hemorrhage, abnormal intra- or extra-axial 

collections or parenchymal lesions are seen.  The shape and configuration 

of the cortical sulci, basal cisterns and ventricles are within normal 

limits.  The grey-white differentiation is preserved.  No evidence of 

mass effect, midline shift, or edema.

 

The osseous structures are unremarkable.   The visualized portions of the 

paranasal sinuses are clear.

 

IMPRESSION:  Normal non-contrast CT scan of the head.

 

CTDI: 70.38 mGy

DLP: 1326.6 mGycm
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
## 2018-10-13 NOTE — DIAGNOSTIC IMAGING REPORT
Cervical Spine, 5 views

 

INDICATION: Pain

 

COMPARISON: None

 

FINDINGS: Multiple views of the cervical spine are obtained.

 

Straightening of the normal cervical lordosis.

 

Tiny anterior osteophytes.

 

Focal calcification of the nuchal ligament.

 

Vertebral body heights and disk spaces are preserved.  Alignment is 

anatomic.  The paraspinal soft tissues are within normal limits.

 

IMPRESSION:  No acute fracture or subluxation.  Straightening of the 

normal cervical lordosis.